# Patient Record
Sex: MALE | Race: WHITE | NOT HISPANIC OR LATINO | URBAN - METROPOLITAN AREA
[De-identification: names, ages, dates, MRNs, and addresses within clinical notes are randomized per-mention and may not be internally consistent; named-entity substitution may affect disease eponyms.]

---

## 2017-04-28 ENCOUNTER — OFFICE VISIT (OUTPATIENT)
Dept: OPTOMETRY | Facility: CLINIC | Age: 42
End: 2017-04-28

## 2017-04-28 DIAGNOSIS — H52.203 ASTIGMATISM, BILATERAL: ICD-10-CM

## 2017-04-28 DIAGNOSIS — H27.03 APHAKIA, BILATERAL: Primary | ICD-10-CM

## 2017-04-28 DIAGNOSIS — H40.003 GLAUCOMA SUSPECT, BOTH EYES: ICD-10-CM

## 2017-04-28 RX ORDER — SERTRALINE HYDROCHLORIDE 100 MG/1
TABLET, FILM COATED ORAL DAILY
COMMUNITY

## 2017-04-28 ASSESSMENT — REFRACTION_CURRENTRX
OD_DIAMETER: 9.0
OD_SPHERE: +16.75
OS_BASECURVE: 45.25
OS_SPHERE: +16.25
OS_DIAMETER: 9.0
OD_BASECURVE: 44.50

## 2017-04-28 ASSESSMENT — REFRACTION_MANIFEST
OS_CYLINDER: SPHERE
OD_SPHERE: +11.25
OD_CYLINDER: SPHERE
OS_SPHERE: +11.75

## 2017-04-28 ASSESSMENT — TONOMETRY
OS_IOP_MMHG: 21
IOP_METHOD: APPLANATION
OD_IOP_MMHG: 24

## 2017-04-28 ASSESSMENT — CONF VISUAL FIELD
OS_NORMAL: 1
OD_NORMAL: 1

## 2017-04-28 ASSESSMENT — CUP TO DISC RATIO
OD_RATIO: 0.10
OS_RATIO: 0.10

## 2017-04-28 ASSESSMENT — EXTERNAL EXAM - RIGHT EYE: OD_EXAM: NORMAL

## 2017-04-28 ASSESSMENT — REFRACTION_WEARINGRX
OS_CYLINDER: SPHERE
OS_SPHERE: +12.25
OD_SPHERE: +11.75
OD_CYLINDER: SPHERE

## 2017-04-28 ASSESSMENT — VISUAL ACUITY
CORRECTION_TYPE: CONTACTS
OS_CC: 20/30-2
METHOD: SNELLEN - LINEAR
OD_CC: 20/20-1

## 2017-04-28 ASSESSMENT — SLIT LAMP EXAM - LIDS
COMMENTS: TR BLEPH
COMMENTS: TR BLEPH

## 2017-04-28 ASSESSMENT — EXTERNAL EXAM - LEFT EYE: OS_EXAM: NORMAL

## 2017-04-28 NOTE — MR AVS SNAPSHOT
After Visit Summary   2017    Seun Edwards    MRN: [id number]           Patient Information     Date Of Birth          1975        Visit Information        Provider Department      2017 9:00 AM Marcela Eugene, [facility]        Today's Diagnoses     Aphakia, bilateral    -  1    Glaucoma suspect, both eyes        Astigmatism, bilateral           Follow-ups after your visit        Who to contact     Please call your clinic at [phone] to:    Ask questions about your health    Make or cancel appointments    Discuss your medicines    Learn about your test results    Speak to your doctor   If you have compliments or concerns about an experience at your clinic, or if you wish to file a complaint, please contact [facility] Patient Relations at [phone] or email us at [email or web address]         Additional Information About Your Visit        MyChart Information     Enpocket is an electronic gateway that provides easy, online access to your medical records. With Enpocket, you can request a clinic appointment, read your test results, renew a prescription or communicate with your care team.     To sign up for Enpocket visit the website at [email or web address]   You will be asked to enter the access code listed below, as well as some personal information. Please follow the directions to create your username and password.     Your access code is: [email or web address]  Expires: 2017 12:31 PM     Your access code will  in 90 days. If you need help or a new code, please contact your [facility] or call [phone] for assistance.        Care EveryWhere ID     This is your Care EveryWhere ID. This could be used by other organizations to access your [facility] medical records  [id number]         Blood Pressure from Last 3 Encounters:   No data found for BP    Weight from Last 3 Encounters:   No data found  for Wt              We Performed the Following     Corneal Topography OU (both eyes)     HVF 24-2 OU     REFRACTION [[id number]]        Primary Care Provider    None Specified       No primary provider on file.        Thank you!     Thank you for choosing [facility]  for your care. Our goal is always to provide you with excellent care. Hearing back from our patients is one way we can continue to improve our services. Please take a few minutes to complete the written survey that you may receive in the mail after your visit with us. Thank you!             Your Updated Medication List - Protect others around you: Learn how to safely use, store and throw away your medicines at www.disposemymeds.org.          This list is accurate as of: 4/28/17 12:31 PM.  Always use your most recent med list.                   Brand Name Dispense Instructions for use    CETIRIZINE HCL PO          sertraline 100 MG tablet    ZOLOFT     Take by mouth daily

## 2017-04-28 NOTE — PROGRESS NOTES
A/P  1.) Aphakia OU 2' congenital cataracts  -Longtime RGP wearer, doing well overall  -Unsure which pair he is wearing, may be slightly older  -Good fit OU, Rx needs adjusting  -Reviewed changes on older lenses may be off and may need adjusting - he understands  -Order new pair, mail to pt. RTC for adjustments  -Spec Rx updated today    2.) Glaucoma suspect 2' to aphakic conditions  -HVF normal today  -IOP slightly higher  -Nerves healthy  -Continue to monitor    RTC prn for CL recheck, otherwise 1 year annual. Discussed importance of regular eyecare given his condition    Contact Lens Billing  V-Code:  - GP Spherical  Final Contact Lens Rx      Brand Base Curve Diameter Sphere Lens Addl. Specs   Right Ramer Thin (ART) 44.50 9.0 +15.75 std steep edge panafocal edge blue dot   Left Ramer Thin (ART) 45.25 9.0 +15.50 std steep edge panafocal edge blue            # of units: 2  Price per Unit: $100    This patient requires contact lenses that are medically necessary for either improvement in vision over spectacles, support of the ocular surface, or other therapeutic benefit. These are not cosmetic contact lenses.     Encounter Diagnoses   Name Primary?     Glaucoma suspect, both eyes      Astigmatism, bilateral      Aphakia, bilateral Yes

## 2024-04-29 ENCOUNTER — TRANSFERRED RECORDS (OUTPATIENT)
Dept: HEALTH INFORMATION MANAGEMENT | Facility: CLINIC | Age: 49
End: 2024-04-29
Payer: COMMERCIAL

## 2024-04-30 ENCOUNTER — MEDICAL CORRESPONDENCE (OUTPATIENT)
Dept: HEALTH INFORMATION MANAGEMENT | Facility: CLINIC | Age: 49
End: 2024-04-30
Payer: COMMERCIAL

## 2024-05-01 ENCOUNTER — TRANSCRIBE ORDERS (OUTPATIENT)
Dept: OTHER | Age: 49
End: 2024-05-01

## 2024-05-01 DIAGNOSIS — R22.0 LOCALIZED SWELLING, MASS AND LUMP, HEAD: ICD-10-CM

## 2024-05-01 DIAGNOSIS — H00.019 HORDEOLUM EXTERNUM, UNSPECIFIED LATERALITY: Primary | ICD-10-CM

## 2024-05-06 NOTE — TELEPHONE ENCOUNTER
FUTURE VISIT INFORMATION      FUTURE VISIT INFORMATION:  Date: 7/30/24  Time: 8:00am  [place] [facility] [place]ERRAL INFORMATION:  Referring provider:  Shanon Caldera MD   Referring providers clinic:  [facility]   Reason for visit/diagnosis  Hordeolum externum, unspecified laterality     RECORDS REQUESTED FROM:       Clinic name Comments Records Status Imaging Status   [facility]  Recs scanned into chart under 4/29/24 EPIC

## 2024-07-25 ENCOUNTER — TELEPHONE (OUTPATIENT)
Dept: OPHTHALMOLOGY | Facility: CLINIC | Age: 49
End: 2024-07-25
Payer: COMMERCIAL

## 2024-07-30 ENCOUNTER — OFFICE VISIT (OUTPATIENT)
Dept: OPTOMETRY | Facility: CLINIC | Age: 49
End: 2024-07-30
Payer: COMMERCIAL

## 2024-07-30 ENCOUNTER — PRE VISIT (OUTPATIENT)
Dept: OPHTHALMOLOGY | Facility: CLINIC | Age: 49
End: 2024-07-30

## 2024-07-30 ENCOUNTER — OFFICE VISIT (OUTPATIENT)
Dept: OPHTHALMOLOGY | Facility: CLINIC | Age: 49
End: 2024-07-30
Payer: COMMERCIAL

## 2024-07-30 DIAGNOSIS — H02.886 MEIBOMIAN GLAND DYSFUNCTION (MGD) OF LEFT EYE: Primary | ICD-10-CM

## 2024-07-30 DIAGNOSIS — H27.03 APHAKIA OF BOTH EYES: Primary | ICD-10-CM

## 2024-07-30 PROBLEM — I10 BENIGN ESSENTIAL HTN: Status: ACTIVE | Noted: 2024-03-07

## 2024-07-30 PROBLEM — M99.02 SEGMENTAL AND SOMATIC DYSFUNCTION OF THORACIC REGION: Status: ACTIVE | Noted: 2017-10-10

## 2024-07-30 PROBLEM — R79.89 SERUM CREATININE RAISED: Status: ACTIVE | Noted: 2019-10-24

## 2024-07-30 PROBLEM — M99.01 CERVICAL SEGMENT DYSFUNCTION: Status: ACTIVE | Noted: 2017-10-10

## 2024-07-30 PROBLEM — S23.3XXA SPRAIN OF UPPER BACK: Status: ACTIVE | Noted: 2023-12-20

## 2024-07-30 PROBLEM — J18.9 PNEUMONIA DUE TO INFECTIOUS ORGANISM, UNSPECIFIED LATERALITY, UNSPECIFIED PART OF LUNG: Status: ACTIVE | Noted: 2023-08-21

## 2024-07-30 PROBLEM — F41.1 GAD (GENERALIZED ANXIETY DISORDER): Status: ACTIVE | Noted: 2024-03-07

## 2024-07-30 PROBLEM — M47.814 SPONDYLOSIS OF THORACIC REGION WITHOUT MYELOPATHY OR RADICULOPATHY: Status: ACTIVE | Noted: 2023-12-20

## 2024-07-30 PROCEDURE — 99203 OFFICE O/P NEW LOW 30 MIN: CPT | Performed by: OPHTHALMOLOGY

## 2024-07-30 RX ORDER — HYDRALAZINE HYDROCHLORIDE 25 MG/1
25 TABLET, FILM COATED ORAL 2 TIMES DAILY
COMMUNITY
Start: 2024-05-08

## 2024-07-30 RX ORDER — LOSARTAN POTASSIUM 100 MG/1
TABLET ORAL
COMMUNITY
Start: 2024-07-27

## 2024-07-30 RX ORDER — FEXOFENADINE HCL 180 MG/1
180 TABLET ORAL
COMMUNITY
Start: 2024-07-02 | End: 2025-07-02

## 2024-07-30 RX ORDER — CHLORTHALIDONE 25 MG/1
12.5 TABLET ORAL
COMMUNITY
Start: 2024-07-10

## 2024-07-30 RX ORDER — AZELASTINE 1 MG/ML
2 SPRAY, METERED NASAL
COMMUNITY
Start: 2024-04-02 | End: 2025-04-02

## 2024-07-30 RX ORDER — AMLODIPINE BESYLATE 10 MG/1
10 TABLET ORAL EVERY MORNING
COMMUNITY

## 2024-07-30 ASSESSMENT — REFRACTION_MANIFEST
OS_SPHERE: +11.25
OS_AXIS: 140
OD_SPHERE: +11.00
OS_CYLINDER: SPHERE
OD_CYLINDER: SPHERE
OS_CYLINDER: +0.25
METHOD_AUTOREFRACTION: 1
OD_SPHERE: +10.50
OS_SPHERE: +11.25

## 2024-07-30 ASSESSMENT — VISUAL ACUITY
OD_CC: 20/25-2
CORRECTION_TYPE: CONTACTS
OS_CC: 20/50
CORRECTION_TYPE: CONTACTS
OS_CC: 20/50+2
VA_OR_OS_CURRENT_RX: 20/25-3
OD_CC: 20/25
METHOD: SNELLEN - LINEAR
OS_CC+: +2
METHOD: SNELLEN - LINEAR
VA_OR_OD_CURRENT_RX: 20/20
OD_CC+: -2

## 2024-07-30 ASSESSMENT — TONOMETRY
IOP_METHOD: ICARE
IOP_METHOD: ICARE
OS_IOP_MMHG: 23
OS_IOP_MMHG: 23
OD_IOP_MMHG: 23
OD_IOP_MMHG: 23

## 2024-07-30 ASSESSMENT — REFRACTION_CURRENTRX
OS_DIAMETER: 7.5
OS_BASECURVE: 45.25
OD_BASECURVE: 44.50
OD_SPHERE: -3.00
OD_SPHERE: +15.75
OS_SPHERE: +15.50
OS_BASECURVE: 45.00
OD_DIAMETER: 7.6
OS_DIAMETER: 9.0
OS_SPHERE: -3.00
OD_DIAMETER: 9.0
OD_BASECURVE: 44.50

## 2024-07-30 ASSESSMENT — REFRACTION_WEARINGRX
OD_CYLINDER: SPHERE
OS_SPHERE: +11.75
OS_ADD: +2.50
OS_CYLINDER: SPHERE
OD_ADD: +2.50
OD_SPHERE: +11.25

## 2024-07-30 ASSESSMENT — CONF VISUAL FIELD
OD_INFERIOR_NASAL_RESTRICTION: 0
OS_NORMAL: 1
OS_INFERIOR_NASAL_RESTRICTION: 0
OS_SUPERIOR_TEMPORAL_RESTRICTION: 0
OD_SUPERIOR_TEMPORAL_RESTRICTION: 0
OD_NORMAL: 1
OD_SUPERIOR_NASAL_RESTRICTION: 0
OS_INFERIOR_TEMPORAL_RESTRICTION: 0
OD_INFERIOR_TEMPORAL_RESTRICTION: 0
OS_SUPERIOR_NASAL_RESTRICTION: 0

## 2024-07-30 ASSESSMENT — SLIT LAMP EXAM - LIDS
COMMENTS: MILD MGD
COMMENTS: MILD MGD

## 2024-07-30 ASSESSMENT — EXTERNAL EXAM - LEFT EYE
OS_EXAM: NORMAL
OS_EXAM: NORMAL

## 2024-07-30 ASSESSMENT — EXTERNAL EXAM - RIGHT EYE
OD_EXAM: NORMAL
OD_EXAM: NORMAL

## 2024-07-30 NOTE — PROGRESS NOTES
Chief Complaint(s) and History of Present Illness(es)     Hordeolum Evaluation           Comments    Seun Edwards is being seen for a consult today by the request of Dr. Caldera for Hordeolum.  Pt states he has been having issues with staph infections in his eyes and   his nose.  He has a considerable amount of gunk in both eyes for about a   year, so he wants to see if there are drainage issues.  He states the   discharge from his eyes smells bad and also states his ear wax smells bad.    The discharge from his eyes is usually yellow/tan and thick.  Sometimes   he wakes up with his eyes glued shut from discharge.   He wears gas permeable lenses as he is aphakic, and has hx of RD repair   right eye in 2017 (Dr. Desouza)    He uses AT's PRN each eye, average BID    Ioana GEE Boss, COT 8:00 AM 07/30/2024    Assessment & Plan     Seun Edwards is a 48 year old male with the following diagnoses:     ICD-10-CM    1. 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 Last episode was in April 2024; has required oral Ab (doxycycline, bactrim, and clindamycin).   On exam today, mild MGD, no new chalazia and patient is comfortable.    Discussed regular WC and lid hygiene  Consider fish oil supplements  Discussed option of long-term low dose doxycycline if he experiences further recurrent episodes    Will be seeing Dr. Eugene later today. Also can discuss option of Tear Care treatments with her.     Patient disposition: follow up with oculoplastics PRN    Rosanne Costa MD  Oculoplastic Surgery Fellow       Attending Physician Attestation: Complete documentation of historical and  exam elements from today's encounter can be found in the full encounter summary report (not reduplicated in this progress note). I personally obtained the chief complaint(s) and history of present illness. I confirmed and edited as necessary the review of systems, past medical/surgical history, family history, social history, and examination findings as documented by others; and I examined the patient myself. I personally reviewed the relevant tests, images, and reports as documented above. I formulated and edited as necessary the assessment and plan and discussed the findings and management plan with the patient.  -Claus Rose MD

## 2024-07-30 NOTE — NURSING NOTE
"Chief Complaints and History of Present Illnesses   Patient presents with    New Eval For Contact Lens     Pt here for contacts.     Chief Complaint(s) and History of Present Illness(es)       New Eval For Contact Lens              Laterality: both eyes    Comments: Pt here for contacts.              Comments    Pt wearing \"back up RGP\" lenses. Lenses are \"quite old\".     YANG Koo on 7/30/2024 at 10:28 AM                     "

## 2024-07-30 NOTE — LETTER
2024         RE:  :  MRN: Seun Edwadrs  1975  [id number]     Dear Dr. Shanon Caldera,    Thank you for asking me to see your patient, Seun Edwards, for an oculoplastic   consultation.  My assessment and plan are below.  For further details, please see my attached clinic note.      Chief Complaint(s) and History of Present Illness(es)     Hordeolum Evaluation           Comments    Seun Edwards is being seen for a consult today by the request of Dr. Caldera for Hordeolum.  Pt states he has been having issues with staph infections in his eyes and   his nose.  He has a considerable amount of gunk in both eyes for about a   year, so he wants to see if there are drainage issues.  He states the   discharge from his eyes smells bad and also states his ear wax smells bad.    The discharge from his eyes is usually yellow/tan and thick.  Sometimes   he wakes up with his eyes glued shut from discharge.   He wears gas permeable lenses as he is aphakic, and has hx of RD repair   right eye in 2017 (Dr. Desouza)    He uses AT's PRN each eye, average BID    Ioana GEE Boss, COT 8:00 AM 2024    Assessment & Plan     Seun Edwards is a 48 year old male with the following diagnoses:     ICD-10-CM    1. Meibomian gland dysfunction (MGD) of left eye  H02.886         POH: pediatric cataract surgery each eye ( left eye,  right eye), RD right eye s/p repair (), gas permeable contact lens use   PMH: recurrent episodes of facial swelling in neck/axilla (follows with ENT), solitary kidney and CKD (follows with nephrology)    Recurrent KIRA chalazia with associated KIRA swelling. Last episode was in 2024; has required oral Ab (doxycycline, bactrim, and clindamycin).   On exam today, mild MGD, no new chalazia and patient is comfortable.    Discussed regular WC and lid hygiene  Consider fish oil supplements  Discussed option of long-term low dose doxycycline if he experiences further recurrent  episodes    Will be seeing Dr. Eugene later today    Patient disposition: follow up with oculoplastics PRN          Again, thank you for allowing me to participate in the care of your patient.      Sincerely,    Claus Rose MD  Department of Ophthalmology and Visual Neurosciences  [facility]    CC: [facility]  [address]   [place] [address]  Via Fax: [phone]

## 2024-07-30 NOTE — PROGRESS NOTES
A/P  1.) Aphakia OU 2' congenital cataracts  -Longtime RGP wearer, doing well overall. Current lenses very old  -Was refit locally but these lenses did not work well for him  -Repeat measurements today with K's/Rx. He prefers slight overplus to function intermediate. Will overplus left eye>right eye for modified mono  -Largely good fit with trial lenses  -Corrects well with updated Rx 20/20- right, 20/25- left    2.) Glaucoma suspect 2' to aphakic conditions  -He is now followed locally for routine eye care    Order new lenses and mail direct. Reviewed adaptation warning. Follow-up prn for lens concerns, otherwise 1-2 years routine    I have confirmed the patient's CC, HPI and reviewed Past Medical History, Past Surgical History, Social History, Family History, Problem List, Medication List and agree with Tech note.     Marcela Eugene, OD FAAO FSLS      Contact Lens Billing  V-Code:  - GP Spherical  Final Contact Lens Rx         Brand Base Curve Diameter Sphere Lens    Right Thinsite (ART) 45/7.5 9.0 +14.75 [place] EO blue, dot    Left Thinsite (ART) 45/7.5 9.0 +15.25 [place] EO blue           # of units: 2  Price per Unit: $100    This patient requires contact lenses that are medically necessary for either improvement in vision over spectacles, support of the ocular surface, or other therapeutic benefit. These are not cosmetic contact lenses.     Encounter Diagnosis   Name Primary?    Aphakia of both eyes Yes

## 2025-09-04 ENCOUNTER — OFFICE VISIT (OUTPATIENT)
Dept: OPTOMETRY | Facility: CLINIC | Age: 50
End: 2025-09-04
Payer: COMMERCIAL

## 2025-09-04 DIAGNOSIS — H27.03 APHAKIA OF BOTH EYES: Primary | ICD-10-CM

## 2025-09-04 ASSESSMENT — REFRACTION_CURRENTRX
OD_SPHERE: +14.75
OS_DIAMETER: 9.0
OS_SPHERE: +15.25
OD_DIAMETER: 9.0